# Patient Record
Sex: FEMALE | Race: WHITE | NOT HISPANIC OR LATINO | URBAN - METROPOLITAN AREA
[De-identification: names, ages, dates, MRNs, and addresses within clinical notes are randomized per-mention and may not be internally consistent; named-entity substitution may affect disease eponyms.]

---

## 2023-01-06 ENCOUNTER — OUTPATIENT (OUTPATIENT)
Dept: INPATIENT UNIT | Facility: HOSPITAL | Age: 58
LOS: 1 days | Discharge: HOME | End: 2023-01-06
Payer: COMMERCIAL

## 2023-01-06 VITALS
OXYGEN SATURATION: 97 % | DIASTOLIC BLOOD PRESSURE: 68 MMHG | SYSTOLIC BLOOD PRESSURE: 104 MMHG | RESPIRATION RATE: 16 BRPM | TEMPERATURE: 97 F | HEART RATE: 84 BPM

## 2023-01-06 VITALS
RESPIRATION RATE: 16 BRPM | TEMPERATURE: 98 F | HEIGHT: 64 IN | OXYGEN SATURATION: 98 % | DIASTOLIC BLOOD PRESSURE: 87 MMHG | HEART RATE: 84 BPM | WEIGHT: 225.09 LBS | SYSTOLIC BLOOD PRESSURE: 127 MMHG

## 2023-01-06 DIAGNOSIS — Z98.890 OTHER SPECIFIED POSTPROCEDURAL STATES: Chronic | ICD-10-CM

## 2023-01-06 DIAGNOSIS — N62 HYPERTROPHY OF BREAST: ICD-10-CM

## 2023-01-06 DIAGNOSIS — Z98.891 HISTORY OF UTERINE SCAR FROM PREVIOUS SURGERY: Chronic | ICD-10-CM

## 2023-01-06 PROCEDURE — 88305 TISSUE EXAM BY PATHOLOGIST: CPT

## 2023-01-06 PROCEDURE — 88305 TISSUE EXAM BY PATHOLOGIST: CPT | Mod: 26

## 2023-01-06 RX ORDER — OXYCODONE HYDROCHLORIDE 5 MG/1
1 TABLET ORAL
Qty: 0 | Refills: 0 | DISCHARGE
Start: 2023-01-06

## 2023-01-06 RX ORDER — ONDANSETRON 8 MG/1
4 TABLET, FILM COATED ORAL ONCE
Refills: 0 | Status: DISCONTINUED | OUTPATIENT
Start: 2023-01-06 | End: 2023-01-06

## 2023-01-06 RX ORDER — OXYCODONE HYDROCHLORIDE 5 MG/1
5 TABLET ORAL ONCE
Refills: 0 | Status: DISCONTINUED | OUTPATIENT
Start: 2023-01-06 | End: 2023-01-06

## 2023-01-06 RX ORDER — FENTANYL CITRATE 50 UG/ML
25 INJECTION INTRAVENOUS
Refills: 0 | Status: DISCONTINUED | OUTPATIENT
Start: 2023-01-06 | End: 2023-01-06

## 2023-01-06 RX ORDER — FENTANYL CITRATE 50 UG/ML
50 INJECTION INTRAVENOUS
Refills: 0 | Status: DISCONTINUED | OUTPATIENT
Start: 2023-01-06 | End: 2023-01-06

## 2023-01-06 RX ORDER — ACETAMINOPHEN 500 MG
975 TABLET ORAL EVERY 6 HOURS
Refills: 0 | Status: DISCONTINUED | OUTPATIENT
Start: 2023-01-06 | End: 2023-01-06

## 2023-01-06 RX ORDER — SODIUM CHLORIDE 9 MG/ML
1000 INJECTION INTRAMUSCULAR; INTRAVENOUS; SUBCUTANEOUS
Refills: 0 | Status: DISCONTINUED | OUTPATIENT
Start: 2023-01-06 | End: 2023-01-06

## 2023-01-06 RX ORDER — HYDROMORPHONE HYDROCHLORIDE 2 MG/ML
0.5 INJECTION INTRAMUSCULAR; INTRAVENOUS; SUBCUTANEOUS ONCE
Refills: 0 | Status: DISCONTINUED | OUTPATIENT
Start: 2023-01-06 | End: 2023-01-06

## 2023-01-06 RX ORDER — SODIUM CHLORIDE 9 MG/ML
1000 INJECTION, SOLUTION INTRAVENOUS
Refills: 0 | Status: DISCONTINUED | OUTPATIENT
Start: 2023-01-06 | End: 2023-01-06

## 2023-01-06 RX ORDER — ACETAMINOPHEN 500 MG
3 TABLET ORAL
Qty: 0 | Refills: 0 | DISCHARGE
Start: 2023-01-06

## 2023-01-06 RX ORDER — OMEPRAZOLE 10 MG/1
1 CAPSULE, DELAYED RELEASE ORAL
Qty: 0 | Refills: 0 | DISCHARGE

## 2023-01-06 RX ORDER — ERGOCALCIFEROL 1.25 MG/1
1 CAPSULE ORAL
Qty: 0 | Refills: 0 | DISCHARGE

## 2023-01-06 RX ADMIN — OXYCODONE HYDROCHLORIDE 5 MILLIGRAM(S): 5 TABLET ORAL at 11:20

## 2023-01-06 NOTE — ASU PATIENT PROFILE, ADULT - FALL HARM RISK - UNIVERSAL INTERVENTIONS
Bed in lowest position, wheels locked, appropriate side rails in place/Call bell, personal items and telephone in reach/Instruct patient to call for assistance before getting out of bed or chair/Non-slip footwear when patient is out of bed/[place] to call system/Physically safe environment - no spills, clutter or unnecessary equipment/Purposeful Proactive Rounding/Room/bathroom lighting operational, light cord in reach

## 2023-01-06 NOTE — ASU DISCHARGE PLAN (ADULT/PEDIATRIC) - NS MD DC FALL RISK RISK
For information on Fall & Injury Prevention, visit: [email or web address] OR  [email or web address] OR  https://www.cdc.gov/steadi/patient.html

## 2023-01-06 NOTE — ASU PATIENT PROFILE, ADULT - NSICDXPASTMEDICALHX_GEN_ALL_CORE_FT
PAST MEDICAL HISTORY:  Alcohol abuse sober since 2012    Anxiety     Asthma     History of bipolar disorder

## 2023-01-06 NOTE — ASU DISCHARGE PLAN (ADULT/PEDIATRIC) - CARE PROVIDER_API CALL
Natali Gomez)  Plastic Surgery  [address], [address]  [place] [address]  Phone: [phone]  Fax: [phone]  Follow Up Time:

## 2023-01-06 NOTE — ASU PATIENT PROFILE, ADULT - NSICDXPASTSURGICALHX_GEN_ALL_CORE_FT
PAST SURGICAL HISTORY:  H/O  section     H/O foot surgery left for arthritis    H/O shoulder surgery bilateral    History of hand surgery left

## 2023-01-06 NOTE — ASU PATIENT PROFILE, ADULT - PAIN SCALE PREFERRED, PROFILE
Chief Complaint   Patient presents with     RECHECK     follow up [place]        Medication Reconciliation: completed   Latasha Rivera LPN  4/29/2020 1:43 PM   
numerical 0-10

## 2023-01-10 LAB — SURGICAL PATHOLOGY STUDY: SIGNIFICANT CHANGE UP

## 2023-01-11 DIAGNOSIS — N62 HYPERTROPHY OF BREAST: ICD-10-CM

## 2023-01-11 DIAGNOSIS — D50.9 IRON DEFICIENCY ANEMIA, UNSPECIFIED: ICD-10-CM

## 2023-01-11 DIAGNOSIS — J45.20 MILD INTERMITTENT ASTHMA, UNCOMPLICATED: ICD-10-CM

## 2023-01-11 DIAGNOSIS — M54.2 CERVICALGIA: ICD-10-CM

## 2023-01-11 DIAGNOSIS — N60.11 DIFFUSE CYSTIC MASTOPATHY OF RIGHT BREAST: ICD-10-CM

## 2023-01-11 DIAGNOSIS — F10.11 ALCOHOL ABUSE, IN REMISSION: ICD-10-CM

## 2023-01-11 DIAGNOSIS — R73.03 PREDIABETES: ICD-10-CM

## 2023-01-11 DIAGNOSIS — Z79.51 LONG TERM (CURRENT) USE OF INHALED STEROIDS: ICD-10-CM

## 2023-01-11 DIAGNOSIS — F31.81 BIPOLAR II DISORDER: ICD-10-CM

## 2023-01-11 DIAGNOSIS — N60.81 OTHER BENIGN MAMMARY DYSPLASIAS OF RIGHT BREAST: ICD-10-CM

## 2023-01-11 DIAGNOSIS — N60.12 DIFFUSE CYSTIC MASTOPATHY OF LEFT BREAST: ICD-10-CM

## 2023-01-11 DIAGNOSIS — D24.1 BENIGN NEOPLASM OF RIGHT BREAST: ICD-10-CM

## 2023-08-15 PROBLEM — F41.9 ANXIETY DISORDER, UNSPECIFIED: Chronic | Status: ACTIVE | Noted: 2023-01-06

## 2023-08-15 PROBLEM — Z86.59 PERSONAL HISTORY OF OTHER MENTAL AND BEHAVIORAL DISORDERS: Chronic | Status: ACTIVE | Noted: 2023-01-06

## 2023-08-15 PROBLEM — F10.10 ALCOHOL ABUSE, UNCOMPLICATED: Chronic | Status: ACTIVE | Noted: 2023-01-06

## 2023-08-15 PROBLEM — J45.909 UNSPECIFIED ASTHMA, UNCOMPLICATED: Chronic | Status: ACTIVE | Noted: 2023-01-06

## 2023-09-08 ENCOUNTER — OUTPATIENT (OUTPATIENT)
Dept: INPATIENT UNIT | Facility: HOSPITAL | Age: 58
LOS: 1 days | Discharge: HOME | End: 2023-09-08
Payer: COMMERCIAL

## 2023-09-08 VITALS
DIASTOLIC BLOOD PRESSURE: 84 MMHG | HEART RATE: 90 BPM | WEIGHT: 225.97 LBS | SYSTOLIC BLOOD PRESSURE: 115 MMHG | HEIGHT: 64 IN | RESPIRATION RATE: 18 BRPM | TEMPERATURE: 98 F | OXYGEN SATURATION: 96 %

## 2023-09-08 VITALS
SYSTOLIC BLOOD PRESSURE: 124 MMHG | HEART RATE: 75 BPM | OXYGEN SATURATION: 94 % | DIASTOLIC BLOOD PRESSURE: 77 MMHG | RESPIRATION RATE: 17 BRPM

## 2023-09-08 DIAGNOSIS — J45.909 UNSPECIFIED ASTHMA, UNCOMPLICATED: ICD-10-CM

## 2023-09-08 DIAGNOSIS — L76.82 OTHER POSTPROCEDURAL COMPLICATIONS OF SKIN AND SUBCUTANEOUS TISSUE: ICD-10-CM

## 2023-09-08 DIAGNOSIS — Z98.890 OTHER SPECIFIED POSTPROCEDURAL STATES: Chronic | ICD-10-CM

## 2023-09-08 DIAGNOSIS — N64.89 OTHER SPECIFIED DISORDERS OF BREAST: ICD-10-CM

## 2023-09-08 DIAGNOSIS — N65.0 DEFORMITY OF RECONSTRUCTED BREAST: ICD-10-CM

## 2023-09-08 DIAGNOSIS — F31.9 BIPOLAR DISORDER, UNSPECIFIED: ICD-10-CM

## 2023-09-08 DIAGNOSIS — K21.9 GASTRO-ESOPHAGEAL REFLUX DISEASE WITHOUT ESOPHAGITIS: ICD-10-CM

## 2023-09-08 DIAGNOSIS — R52 PAIN, UNSPECIFIED: ICD-10-CM

## 2023-09-08 DIAGNOSIS — N60.91 UNSPECIFIED BENIGN MAMMARY DYSPLASIA OF RIGHT BREAST: ICD-10-CM

## 2023-09-08 DIAGNOSIS — K44.9 DIAPHRAGMATIC HERNIA WITHOUT OBSTRUCTION OR GANGRENE: ICD-10-CM

## 2023-09-08 DIAGNOSIS — E66.9 OBESITY, UNSPECIFIED: ICD-10-CM

## 2023-09-08 DIAGNOSIS — L90.5 SCAR CONDITIONS AND FIBROSIS OF SKIN: ICD-10-CM

## 2023-09-08 DIAGNOSIS — Z98.891 HISTORY OF UTERINE SCAR FROM PREVIOUS SURGERY: Chronic | ICD-10-CM

## 2023-09-08 LAB
ABO RH CONFIRMATION: SIGNIFICANT CHANGE UP
BLD GP AB SCN SERPL QL: SIGNIFICANT CHANGE UP

## 2023-09-08 PROCEDURE — 86900 BLOOD TYPING SEROLOGIC ABO: CPT

## 2023-09-08 PROCEDURE — 86850 RBC ANTIBODY SCREEN: CPT

## 2023-09-08 PROCEDURE — 88305 TISSUE EXAM BY PATHOLOGIST: CPT | Mod: 26

## 2023-09-08 PROCEDURE — 86901 BLOOD TYPING SEROLOGIC RH(D): CPT

## 2023-09-08 PROCEDURE — 14301 TIS TRNFR ANY 30.1-60 SQ CM: CPT | Mod: AS

## 2023-09-08 PROCEDURE — 88305 TISSUE EXAM BY PATHOLOGIST: CPT

## 2023-09-08 PROCEDURE — 36415 COLL VENOUS BLD VENIPUNCTURE: CPT

## 2023-09-08 RX ORDER — OXYCODONE HYDROCHLORIDE 5 MG/1
5 TABLET ORAL EVERY 6 HOURS
Refills: 0 | Status: DISCONTINUED | OUTPATIENT
Start: 2023-09-08 | End: 2023-09-08

## 2023-09-08 RX ORDER — ALBUTEROL 90 UG/1
2 AEROSOL, METERED ORAL
Qty: 0 | Refills: 0 | DISCHARGE

## 2023-09-08 RX ORDER — DULOXETINE HYDROCHLORIDE 30 MG/1
1 CAPSULE, DELAYED RELEASE ORAL
Qty: 0 | Refills: 0 | DISCHARGE

## 2023-09-08 RX ORDER — FENTANYL CITRATE 50 UG/ML
50 INJECTION INTRAVENOUS
Refills: 0 | Status: DISCONTINUED | OUTPATIENT
Start: 2023-09-08 | End: 2023-09-08

## 2023-09-08 RX ORDER — LAMOTRIGINE 25 MG/1
0 TABLET, ORALLY DISINTEGRATING ORAL
Qty: 0 | Refills: 0 | DISCHARGE

## 2023-09-08 RX ORDER — OXYCODONE HYDROCHLORIDE 5 MG/1
10 TABLET ORAL ONCE
Refills: 0 | Status: DISCONTINUED | OUTPATIENT
Start: 2023-09-08 | End: 2023-09-08

## 2023-09-08 RX ORDER — HYDROMORPHONE HYDROCHLORIDE 2 MG/ML
1 INJECTION INTRAMUSCULAR; INTRAVENOUS; SUBCUTANEOUS EVERY 4 HOURS
Refills: 0 | Status: DISCONTINUED | OUTPATIENT
Start: 2023-09-08 | End: 2023-09-08

## 2023-09-08 RX ORDER — ACETAMINOPHEN 500 MG
1000 TABLET ORAL ONCE
Refills: 0 | Status: COMPLETED | OUTPATIENT
Start: 2023-09-08 | End: 2023-09-08

## 2023-09-08 RX ORDER — BUDESONIDE AND FORMOTEROL FUMARATE DIHYDRATE 160; 4.5 UG/1; UG/1
2 AEROSOL RESPIRATORY (INHALATION)
Qty: 0 | Refills: 0 | DISCHARGE

## 2023-09-08 RX ORDER — TOPIRAMATE 25 MG
1 TABLET ORAL
Qty: 0 | Refills: 0 | DISCHARGE

## 2023-09-08 RX ORDER — SEMAGLUTIDE 0.68 MG/ML
0 INJECTION, SOLUTION SUBCUTANEOUS
Qty: 0 | Refills: 0 | DISCHARGE

## 2023-09-08 RX ORDER — ONDANSETRON 8 MG/1
4 TABLET, FILM COATED ORAL EVERY 6 HOURS
Refills: 0 | Status: DISCONTINUED | OUTPATIENT
Start: 2023-09-08 | End: 2023-09-08

## 2023-09-08 RX ORDER — TRAZODONE HCL 50 MG
1 TABLET ORAL
Qty: 0 | Refills: 0 | DISCHARGE

## 2023-09-08 RX ADMIN — Medication 400 MILLIGRAM(S): at 15:09

## 2023-09-08 RX ADMIN — FENTANYL CITRATE 50 MICROGRAM(S): 50 INJECTION INTRAVENOUS at 15:13

## 2023-09-08 RX ADMIN — OXYCODONE HYDROCHLORIDE 5 MILLIGRAM(S): 5 TABLET ORAL at 15:30

## 2023-09-08 NOTE — ASU PATIENT PROFILE, ADULT - FALL HARM RISK - UNIVERSAL INTERVENTIONS
Bed in lowest position, wheels locked, appropriate side rails in place/Call bell, personal items and telephone in reach/Instruct patient to call for assistance before getting out of bed or chair/Non-slip footwear when patient is out of bed/[place] to call system/Physically safe environment - no spills, clutter or unnecessary equipment/Purposeful Proactive Rounding/Room/bathroom lighting operational, light cord in reach

## 2023-09-08 NOTE — BRIEF OPERATIVE NOTE - NSICDXBRIEFPOSTOP_GEN_ALL_CORE_FT
POST-OP DIAGNOSIS:  Painful scar 08-Sep-2023 14:56:23  Natali Gomez  Breast asymmetry in female 08-Sep-2023 14:56:25  Natali Gomez

## 2023-09-08 NOTE — ASU DISCHARGE PLAN (ADULT/PEDIATRIC) - CALL YOUR DOCTOR IF YOU HAVE ANY OF THE FOLLOWING:
Problem: Communication  Goal: The ability to communicate needs accurately and effectively will improve    Intervention: Educate patient and significant other/support system about the plan of care, procedures, treatments, medications and allow for questions  POC discussed with pt including medications, unit routine, and splint change tomorrow.       Problem: Mobility  Goal: Risk for activity intolerance will decrease    Intervention: Encourage patient to increase activity level in collaboration with Interdisciplinary Team  PT worked with pt today, pt ambulated unit.          Bleeding that does not stop/Swelling that gets worse/Pain not relieved by Medications/Fever greater than (need to indicate Fahrenheit or Celsius)

## 2023-09-08 NOTE — ASU DISCHARGE PLAN (ADULT/PEDIATRIC) - NS MD DC FALL RISK RISK
For information on Fall & Injury Prevention, visit: [email or web address] OR  [email or web address] OR  https://www.cdc.gov/steadi/patient.html

## 2023-09-08 NOTE — BRIEF OPERATIVE NOTE - NSICDXBRIEFPROCEDURE_GEN_ALL_CORE_FT
PROCEDURES:  Combined mammaplasty revision with liposuction 08-Sep-2023 14:53:09  Natali Gomez  Fat graft, breast 08-Sep-2023 14:53:19  Natali Gomez

## 2023-09-08 NOTE — ASU DISCHARGE PLAN (ADULT/PEDIATRIC) - CARE PROVIDER_API CALL
Natali Gomez  Plastic Surgery  [address] [place] [address], [address]  [place] [address]  Phone: [phone]  Fax: [phone]  Established Patient  Scheduled Appointment: 09/11/2023

## 2023-09-08 NOTE — BRIEF OPERATIVE NOTE - NSICDXBRIEFPREOP_GEN_ALL_CORE_FT
PRE-OP DIAGNOSIS:  Painful scar 08-Sep-2023 14:56:00  Natali Gomez  Breast asymmetry in female 08-Sep-2023 14:56:06  Natali Gomez

## 2023-09-08 NOTE — BRIEF OPERATIVE NOTE - OPERATION/FINDINGS
Pt has a contracted scar over her left nac area that fat grafting was utilized to help soften the scar

## 2023-09-12 LAB — SURGICAL PATHOLOGY STUDY: SIGNIFICANT CHANGE UP
